# Patient Record
Sex: MALE | Race: WHITE | NOT HISPANIC OR LATINO | Employment: FULL TIME | URBAN - METROPOLITAN AREA
[De-identification: names, ages, dates, MRNs, and addresses within clinical notes are randomized per-mention and may not be internally consistent; named-entity substitution may affect disease eponyms.]

---

## 2017-10-17 ENCOUNTER — HOSPITAL ENCOUNTER (EMERGENCY)
Facility: HOSPITAL | Age: 42
Discharge: HOME | End: 2017-10-17
Attending: EMERGENCY MEDICINE | Admitting: EMERGENCY MEDICINE

## 2017-10-17 VITALS
DIASTOLIC BLOOD PRESSURE: 67 MMHG | HEART RATE: 72 BPM | HEIGHT: 70 IN | BODY MASS INDEX: 25.05 KG/M2 | RESPIRATION RATE: 16 BRPM | WEIGHT: 175 LBS | TEMPERATURE: 99 F | SYSTOLIC BLOOD PRESSURE: 119 MMHG | OXYGEN SATURATION: 96 %

## 2017-10-17 DIAGNOSIS — R22.0 FACIAL SWELLING: ICD-10-CM

## 2017-10-17 DIAGNOSIS — K02.9 CHRONIC ENAMEL DENTAL CARIES: ICD-10-CM

## 2017-10-17 DIAGNOSIS — K04.7 DENTAL INFECTION: Primary | ICD-10-CM

## 2017-10-17 PROCEDURE — 99284 EMERGENCY DEPT VISIT MOD MDM: CPT

## 2017-10-17 RX ORDER — PENICILLIN V POTASSIUM 500 MG/1
500 TABLET ORAL 4 TIMES DAILY
Qty: 40 TABLET | Refills: 0 | Status: SHIPPED | OUTPATIENT
Start: 2017-10-17 | End: 2017-10-27

## 2017-10-17 RX ORDER — HYDROCODONE BITARTRATE AND ACETAMINOPHEN 5; 325 MG/1; MG/1
1 TABLET ORAL ONCE
Status: COMPLETED | OUTPATIENT
Start: 2017-10-17 | End: 2017-10-17

## 2017-10-17 RX ORDER — HYDROCODONE BITARTRATE AND ACETAMINOPHEN 5; 325 MG/1; MG/1
1 TABLET ORAL EVERY 6 HOURS PRN
Qty: 6 TABLET | Refills: 0 | Status: SHIPPED | OUTPATIENT
Start: 2017-10-17

## 2017-10-17 RX ADMIN — HYDROCODONE BITARTRATE AND ACETAMINOPHEN 1 TABLET: 5; 325 TABLET ORAL at 15:57

## 2017-10-17 NOTE — ED NOTES
Pt walked back from triage and is complaining of left sided facial swelling after some left sided dental pain the last couple of days.  Pt also reports he has had a cough and congestion for awhile.     Rashida Ortiz RN  10/17/17 0347

## 2017-10-17 NOTE — ED PROVIDER NOTES
Pt presents complaining of left sided dental pain for 3 days and left sided facial swelling onset this morning. Upon exam, pt is alert and oriented. There are diffuse dental caries, and left posterior maxilla there are areas where tooth are chipped. There is mild gingival swelling and erythema. No abscess. Mild swelling of left cheek. Pt will be provided with information on dental clinic and an antibiotic to begin infection treatment.     I supervised care provided by the midlevel provider.    We have discussed this patient's history, physical exam, and treatment plan.   I have reviewed the note and personally saw and examined the patient and agree with the plan of care.    Documentation assistance provided by flynn Armenta for Mohit Hernandez.  Information recorded by the scribe was done at my direction and has been verified and validated by me.       Lissette Armenta  10/17/17 1621       Mohit Hernandez MD  10/17/17 7238

## 2017-10-17 NOTE — ED PROVIDER NOTES
EMERGENCY DEPARTMENT ENCOUNTER    CHIEF COMPLAINT  Chief Complaint: Facial Swelling  History given by:Patient  History limited by:Nothing  Room Number: 34/34  PMD: No Known Provider      HPI:  Pt is a 42 y.o. male who presents to the ED with left sided facial swelling which the patient initially noticed after he woke up this morning. He reports that he developed gradually worsening left upper dental pain three days ago but he denies any recent facial trauma. He explains that the pain is exacerbated with palpation and alleviated with no known factors. Patient has also experienced sinus congestion and a non-productive cough for the past week but he denies any recent fever, chills, nausea, vomiting or diarrhea since onset. No other complaints at this time.    Duration: 1 day  Timing:Constant  Location:Left face  Radiation:None  Quality:Sharp  Intensity/Severity:Moderate  Progression:Worsened  Associated Symptoms:Facial swelling, dental pain, sinus congestion, cough  Aggravating Factors:Palpation  Alleviating Factors:Rest  Previous Episodes:None  Treatment before arrival:None    MEDICAL RECORD REVIEW  No pertinent history in EPIC.    PAST MEDICAL HISTORY  Active Ambulatory Problems     Diagnosis Date Noted   • No Active Ambulatory Problems     Resolved Ambulatory Problems     Diagnosis Date Noted   • No Resolved Ambulatory Problems     No Additional Past Medical History       PAST SURGICAL HISTORY  History reviewed. No pertinent surgical history.    FAMILY HISTORY  History reviewed. No pertinent family history.    SOCIAL HISTORY  Social History     Social History   • Marital status: Single     Spouse name: N/A   • Number of children: N/A   • Years of education: N/A     Occupational History   • Not on file.     Social History Main Topics   • Smoking status: Light Tobacco Smoker   • Smokeless tobacco: Not on file   • Alcohol use No   • Drug use: No   • Sexual activity: Defer     Other Topics Concern   • Not on file      Social History Narrative   • No narrative on file       ALLERGIES  Review of patient's allergies indicates no known allergies.    REVIEW OF SYSTEMS  Review of Systems   Constitutional: Negative.  Negative for chills and fever.   HENT: Positive for dental problem, facial swelling (L) and sinus pressure. Negative for sore throat.    Eyes: Negative.    Respiratory: Positive for cough.    Cardiovascular: Negative.  Negative for chest pain.   Gastrointestinal: Negative.    Genitourinary: Negative.  Negative for dysuria.   Musculoskeletal: Negative.  Negative for back pain.   Skin: Negative.  Negative for rash.   Neurological: Negative.  Negative for headaches.       PHYSICAL EXAM  ED Triage Vitals   Temp Heart Rate Resp BP SpO2   10/17/17 1402 10/17/17 1402 10/17/17 1403 10/17/17 1403 10/17/17 1402   97 °F (36.1 °C) 99 16 134/88 90 %      Temp src Heart Rate Source Patient Position BP Location FiO2 (%)   10/17/17 1402 10/17/17 1402 -- -- --   Tympanic Monitor          Physical Exam   Constitutional: He is oriented to person, place, and time and well-developed, well-nourished, and in no distress. No distress.   HENT:   Head: Normocephalic and atraumatic.   Mouth/Throat: Oropharynx is clear and moist.   Swelling at left cheek extends under his eye. No swelling or redness around left orbit. No swelling submandibular area or under tongue. Multiple teeth broken extensive dental caries. Extensive receeding at gingiva and gumline.      Eyes: EOM are normal. Pupils are equal, round, and reactive to light.   EOM intact without entrapment.   Neck: Normal range of motion. Neck supple.   Cardiovascular: Normal rate and regular rhythm.    Pulmonary/Chest: Effort normal and breath sounds normal. No respiratory distress. He has no wheezes.   Abdominal: Soft.   Musculoskeletal: Normal range of motion. He exhibits no edema.   Lymphadenopathy:     He has no cervical adenopathy.   Neurological: He is alert and oriented to person,  "place, and time.   Skin: Skin is warm and dry. No rash noted. No pallor.   Psychiatric: Mood, memory, affect and judgment normal.   Nursing note and vitals reviewed.    PROGRESS AND CONSULTS    Progress Notes:    1602  Reviewed pt's history and workup with Dr. Hernandez.  After a bedside evaluation; Dr Hernandez agrees with the plan of care.     1634  The patient's history and physical exam were discussed with the physician, who also performed a face to face history and physical exam.  I discussed all results and noted any abnormalities with patient.  Discussed absoute need to recheck abnormalities with their family physician.  I answered any of the patient's questions.  Discussed plan for discharge, as there is no emergent indication for admission.  Pt is agreeable and understands need for follow up and repeat testing.  Pt is aware that discharge does not mean that nothing is wrong but it indicates no emergency is present and they must continue care with their family physician.  Pt is discharged with instructions to follow up with primary care doctor to have their blood pressure rechecked.       MEDICATIONS GIVEN IN ER  Medications   HYDROcodone-acetaminophen (NORCO) 5-325 MG per tablet 1 tablet (1 tablet Oral Given 10/17/17 1557)       /78 (BP Location: Right arm, Patient Position: Sitting)  Pulse 80  Temp 97 °F (36.1 °C) (Tympanic)   Resp 18  Ht 70\" (177.8 cm)  Wt 175 lb (79.4 kg)  SpO2 94%  BMI 25.11 kg/m2      DIAGNOSIS  Final diagnoses:   Dental infection   Facial swelling   Chronic enamel dental caries       FOLLOW UP   Dental clinics listed in discharge instructions            RX     Medication List      New Prescriptions          HYDROcodone-acetaminophen 5-325 MG per tablet   Commonly known as:  NORCO   Take 1 tablet by mouth Every 6 (Six) Hours As Needed for Moderate Pain .       penicillin v potassium 500 MG tablet   Commonly known as:  VEETID   Take 1 tablet by mouth 4 (Four) Times a Day for 10 " days.           Documentation assistance provided by flynn Almanza for Tita Rush PA-C.  Information recorded by the vinayibe was done at my direction and has been verified and validated by me.  Electronically signed by Tita Rush PA-C on 10/17/2017 at time 4:40 PM            Cuate Almanza  10/17/17 8571       Tita Rush PA-C  10/17/17 8621

## 2017-10-17 NOTE — DISCHARGE INSTRUCTIONS
PLEASE READ AND REVIEW ALL DISCHARGE INSTRUCTIONS.     Please follow up with your primary care physician for any further evaluation/treatment and further management of your blood pressure.     Recheck in emergency department for any worsening and/or concerning symptoms.     Take all prescribed medicine as written and continue chronic medication.    Take antibiotic to completion.  Pain medicine as needed.      [facility] List    [facility]  [address]  [phone] Based on Income (Monday-Friday)  Appointment ONLY 8am-1pm  $15 minimum   [facility]  [address]  [phone] Based on Income (Monday-Friday)  Walk in at [facility] at 7:30 am  $12-24 minimum   [facility]  [address]   [phone] By appointment ONLY  Must call by 8:30 am to obtain an appointment for the next day  $50 minimum   [facility]  [address]  [phone] Must be homeless to be seen   [facility]  [address]  [phone] Walk in and appointments  7 days a week 9am-9pm  $83 minimum   [facility]  [address]  [phone] Walk in and appointments  9am-4pm  $50 minimum  Passport and other insurances accepted   [facility]  [address]  [phone] Walk in and appointments  9am-4pm  $50 minimum  Passport and other insurances accepted   [address] [place] [address] Dental  [address]  [phone] Walk in and appointments  9am-4pm  $50 minimum  Passport and other insurances accepted   [facility]  [phone] Walk in and appointments  9am-4pm  $50 minimum  Passport and other insurances accepted   [address]  [phone] Walk in and appointments  9am-4pm  $50 minimum  Passport and other insurances accepted       List listing is provided only as an informal guide and is not guaranteed to be complete or correct.  Please may your own inquiries and confirm the terms of care  prior to setting an appointment.